# Patient Record
(demographics unavailable — no encounter records)

---

## 2025-05-19 NOTE — REASON FOR VISIT
[Symptom and Test Evaluation] : symptom and test evaluation [Hyperlipidemia] : hyperlipidemia [FreeTextEntry1] : 40  year old man with CP. Symptoms start 6 weeks ago, no context, last 1 minute, moderate intensity. Seen in ED and urgent care.  I personally reviewed the patient's outpatient records from the primary physician. , , A1C 5.6%  1. Check EST and 2D echo.  The patient will contact me via phone or patient portal after testing to obtain results. 2. Elevated Hgb A1C. We reviewed carbohydrate restriction in detail. We have reviewed current AHA exercise guidelines, including 30 minutes 5 days per week of moderate level aerobic activity and 20 minutes twice per week of strength training.

## 2025-05-19 NOTE — ASSESSMENT
[FreeTextEntry1] : 1. Check EST and 2D echo.  The patient will contact me via phone or patient portal after testing to obtain results. 2. Elevated Hgb A1C. We reviewed carbohydrate restriction in detail. We have reviewed current AHA exercise guidelines, including 30 minutes 5 days per week of moderate level aerobic activity and 20 minutes twice per week of strength training.